# Patient Record
Sex: MALE | Race: BLACK OR AFRICAN AMERICAN | NOT HISPANIC OR LATINO | URBAN - METROPOLITAN AREA
[De-identification: names, ages, dates, MRNs, and addresses within clinical notes are randomized per-mention and may not be internally consistent; named-entity substitution may affect disease eponyms.]

---

## 2017-01-09 ENCOUNTER — OFFICE VISIT (OUTPATIENT)
Dept: RETAIL CLINIC | Facility: CLINIC | Age: 52
End: 2017-01-09

## 2017-01-09 VITALS
RESPIRATION RATE: 20 BRPM | HEART RATE: 87 BPM | OXYGEN SATURATION: 98 % | TEMPERATURE: 98.4 F | SYSTOLIC BLOOD PRESSURE: 130 MMHG | DIASTOLIC BLOOD PRESSURE: 90 MMHG

## 2017-01-09 DIAGNOSIS — Z84.89 FHX: ALLERGIES: ICD-10-CM

## 2017-01-09 DIAGNOSIS — J01.40 ACUTE PANSINUSITIS, RECURRENCE NOT SPECIFIED: Primary | ICD-10-CM

## 2017-01-09 DIAGNOSIS — J45.30 MILD PERSISTENT ASTHMA WITHOUT COMPLICATION: ICD-10-CM

## 2017-01-09 PROCEDURE — 99203 OFFICE O/P NEW LOW 30 MIN: CPT | Performed by: NURSE PRACTITIONER

## 2017-01-09 RX ORDER — ALBUTEROL SULFATE 90 UG/1
2 AEROSOL, METERED RESPIRATORY (INHALATION) EVERY 4 HOURS PRN
Qty: 1 INHALER | Refills: 0 | Status: SHIPPED | OUTPATIENT
Start: 2017-01-09 | End: 2017-02-08

## 2017-01-09 RX ORDER — BENZONATATE 200 MG/1
200 CAPSULE ORAL 3 TIMES DAILY PRN
Qty: 15 CAPSULE | Refills: 0 | Status: SHIPPED | OUTPATIENT
Start: 2017-01-09 | End: 2017-01-14

## 2017-01-09 RX ORDER — EPINEPHRINE 0.3 MG/.3ML
0.3 INJECTION SUBCUTANEOUS AS NEEDED
Qty: 1 EACH | Refills: 0 | Status: SHIPPED | OUTPATIENT
Start: 2017-01-09

## 2017-01-09 RX ORDER — FLUTICASONE PROPIONATE 50 MCG
2 SPRAY, SUSPENSION (ML) NASAL DAILY
Qty: 1 EACH | Refills: 0 | Status: SHIPPED | OUTPATIENT
Start: 2017-01-09 | End: 2017-02-08

## 2017-01-09 RX ORDER — DIPHENHYDRAMINE HCL 25 MG
25 CAPSULE ORAL EVERY 6 HOURS PRN
COMMUNITY

## 2017-01-09 RX ORDER — MONTELUKAST SODIUM 10 MG/1
10 TABLET ORAL NIGHTLY
Qty: 30 TABLET | Refills: 0 | Status: SHIPPED | OUTPATIENT
Start: 2017-01-09 | End: 2017-02-08

## 2017-01-09 RX ORDER — AMOXICILLIN AND CLAVULANATE POTASSIUM 875; 125 MG/1; MG/1
1 TABLET, FILM COATED ORAL 2 TIMES DAILY
Qty: 20 TABLET | Refills: 0 | Status: SHIPPED | OUTPATIENT
Start: 2017-01-09 | End: 2017-01-19

## 2017-01-09 NOTE — MR AVS SNAPSHOT
Ferdinand Bennett   1/9/2017 1:15 PM   Office Visit    Dept Phone:  [phone]   Encounter #:  [id number]    Provider:  MAYELA ALVA   Department:  [facility]                Your Full Care Plan              Today's Medication Changes          These changes are accurate as of: 1/9/17  1:46 PM.  If you have any questions, ask your nurse or doctor.               New Medication(s)Ordered:     albuterol 108 (90 BASE) MCG/ACT inhaler   Commonly known as:  PROVENTIL HFA;VENTOLIN HFA   Inhale 2 puffs Every 4 (Four) Hours As Needed for wheezing or shortness of air for up to 30 days.       amoxicillin-clavulanate 875-125 MG per tablet   Commonly known as:  AUGMENTIN   Take 1 tablet by mouth 2 (Two) Times a Day for 10 days.       benzonatate 200 MG capsule   Commonly known as:  TESSALON   Take 1 capsule by mouth 3 (Three) Times a Day As Needed for cough for up to 5 days.       EPINEPHrine 0.3 MG/0.3ML solution auto-injector injection   Commonly known as:  EPIPEN 2-KHADIJAH   Inject 0.3 mL under the skin As Needed (for severe reaction/resp distress).       fluticasone 50 MCG/ACT nasal spray   Commonly known as:  FLONASE   2 sprays into each nostril Daily for 30 days.       montelukast 10 MG tablet   Commonly known as:  SINGULAIR   Take 1 tablet by mouth Every Night for 30 days.            Where to Get Your Medications      These medications were sent to [facility] [address] [place] [address] [address] [place] [address] [facility] [phone] [facility] [phone]   [address] [facility] [address]     Phone:  [phone]     albuterol 108 (90 BASE) MCG/ACT inhaler    amoxicillin-clavulanate 875-125 MG per tablet    benzonatate 200 MG capsule    EPINEPHrine 0.3 MG/0.3ML solution auto-injector injection    fluticasone 50 MCG/ACT nasal spray    montelukast 10 MG tablet                  Your Updated Medication List          This list is accurate as of:  1/9/17  1:46 PM.  Always use your most recent med list.                albuterol 108 (90 BASE) MCG/ACT inhaler   Commonly known as:  PROVENTIL HFA;VENTOLIN HFA   Inhale 2 puffs Every 4 (Four) Hours As Needed for wheezing or shortness of air for up to 30 days.       amoxicillin-clavulanate 875-125 MG per tablet   Commonly known as:  AUGMENTIN   Take 1 tablet by mouth 2 (Two) Times a Day for 10 days.       benzonatate 200 MG capsule   Commonly known as:  TESSALON   Take 1 capsule by mouth 3 (Three) Times a Day As Needed for cough for up to 5 days.       EPINEPHrine 0.3 MG/0.3ML solution auto-injector injection   Commonly known as:  EPIPEN 2-KHADIJAH   Inject 0.3 mL under the skin As Needed (for severe reaction/resp distress).       fluticasone 50 MCG/ACT nasal spray   Commonly known as:  FLONASE   2 sprays into each nostril Daily for 30 days.       fluticasone-salmeterol 100-50 MCG/DOSE DISKUS   Commonly known as:  ADVAIR       montelukast 10 MG tablet   Commonly known as:  SINGULAIR   Take 1 tablet by mouth Every Night for 30 days.               You Were Diagnosed With        Codes Comments    Acute pansinusitis, recurrence not specified    -  Primary ICD-10-CM: J01.40  ICD-9-CM: 461.8     FHx: allergies     ICD-10-CM: Z84.89  ICD-9-CM: V19.6     Mild persistent asthma without complication     ICD-10-CM: J45.30  ICD-9-CM: 493.90       Instructions     None    Patient Instructions History      Upcoming Appointments     Visit Type Date Time Department    NEW PATIENT 1/9/2017  1:15 PM [facility] [place]      [employer] Signup     [facility] allows you to send messages to your doctor, view your test results, renew your prescriptions, schedule appointments, and more. To sign up, go to [employer] and click on the Sign Up Now link in the New User? box. Enter your [employer] Activation Code exactly as it appears below along with the last four digits of your Social Security Number and your Date of Birth  () to complete the sign-up process. If you do not sign up before the expiration date, you must request a new code.    [facility] Activation Code: [email or web address]  Expires: 2017  1:46 PM    If you have questions, you can email [email or web address] or call [phone] to talk to our [facility] staff. Remember, [facility] is NOT to be used for urgent needs. For medical emergencies, dial 911.               Other Info from Your Visit           Allergies     No Known Allergies      Reason for Visit     Cough           Vital Signs     Blood Pressure Pulse Temperature Respirations Oxygen Saturation Smoking Status    130/90 (BP Location: Right arm) 87 98.4 °F (36.9 °C) (Oral) 20 98% Current Every Day Smoker      Problems and Diagnoses Noted     Acute sinus infection    -  Primary    Family history of allergic disorder        Asthma

## 2017-01-09 NOTE — PROGRESS NOTES
Subjective   Ferdinand Bennett is a 51 y.o. male. Headache, chest/nasal congestion, itchy throat X 1 week.  Having sweating episodes. Feels like he is running a fever. Has taken multiple OTC medications with no improvement. Multiple ill contacts at home and his [employer] orth[employer]. Takes benadryl and Singulair  Daily. Has one dose left on Singulair; request refill today. Has a history of severe allergic reaction. 6th months ago, had reaction that require EMS to be called and hospitalization. At this time, he was referred to an [facility] to have skin testing. Has since moved from [facility] and needs to make an appointment with [facility]. He is concerned because he does not currently have any rescue/emergnecy medication to take in case off allergic reaction. See ROS.     History of Present Illness     The following portions of the patient's history were reviewed and updated as appropriate: allergies, current medications, past family history, past medical history, past social history, past surgical history and problem list.    Review of Systems   Constitutional: Positive for chills, diaphoresis and fever (subjective).   HENT: Positive for congestion, rhinorrhea, sinus pressure, sneezing and sore throat. Negative for postnasal drip.    Respiratory: Positive for cough (dry nonproductive). Negative for chest tightness, shortness of breath and wheezing.    Gastrointestinal: Negative.        Objective   Physical Exam   Constitutional: He appears well-developed.   HENT:   Head: Normocephalic.   Right Ear: Tympanic membrane and ear canal normal.   Left Ear: Tympanic membrane and ear canal normal.   Nose: Mucosal edema, rhinorrhea and sinus tenderness present. Right sinus exhibits no maxillary sinus tenderness and no frontal sinus tenderness. Left sinus exhibits no maxillary sinus tenderness and no frontal sinus tenderness.   Mouth/Throat: Uvula is midline and mucous membranes are normal. Posterior oropharyngeal erythema present. No  tonsillar exudate.   Cardiovascular: Normal rate and regular rhythm.    Pulmonary/Chest: Effort normal and breath sounds normal.   Lymphadenopathy:        Head (right side): No tonsillar adenopathy present.        Head (left side): No tonsillar adenopathy present.     He has no cervical adenopathy.   Skin: Skin is warm and dry.       Assessment/Plan   Ferdinand was seen today for cough.    Diagnoses and all orders for this visit:    Acute pansinusitis, recurrence not specified    FHx: allergies    Mild persistent asthma without complication    Other orders  -     amoxicillin-clavulanate (AUGMENTIN) 875-125 MG per tablet; Take 1 tablet by mouth 2 (Two) Times a Day for 10 days.  -     benzonatate (TESSALON) 200 MG capsule; Take 1 capsule by mouth 3 (Three) Times a Day As Needed for cough for up to 5 days.  -     fluticasone (FLONASE) 50 MCG/ACT nasal spray; 2 sprays into each nostril Daily for 30 days.  -     albuterol (PROVENTIL HFA;VENTOLIN HFA) 108 (90 BASE) MCG/ACT inhaler; Inhale 2 puffs Every 4 (Four) Hours As Needed for wheezing or shortness of air for up to 30 days.  -     EPINEPHrine (EPIPEN 2-KHADIJAH) 0.3 MG/0.3ML solution auto-injector injection; Inject 0.3 mL under the skin As Needed (for severe reaction/resp distress).  -     montelukast (SINGULAIR) 10 MG tablet; Take 1 tablet by mouth Every Night for 30 days.          Visit information reviewed with patient, including medication prescribed and patient instructions. All questions answered and given to patient/and or caregiver.     If symptoms worsen with fever >103, please seek further evaluation in the ER. Please F/U with PCP with concerns/and questions.     Marion Conde, APRN

## 2017-03-29 ENCOUNTER — OFFICE VISIT (OUTPATIENT)
Dept: RETAIL CLINIC | Facility: CLINIC | Age: 52
End: 2017-03-29

## 2017-03-29 VITALS
RESPIRATION RATE: 20 BRPM | OXYGEN SATURATION: 99 % | SYSTOLIC BLOOD PRESSURE: 134 MMHG | DIASTOLIC BLOOD PRESSURE: 80 MMHG | HEART RATE: 90 BPM

## 2017-03-29 DIAGNOSIS — J45.901 ASTHMA EXACERBATION: Primary | ICD-10-CM

## 2017-03-29 PROCEDURE — 99213 OFFICE O/P EST LOW 20 MIN: CPT | Performed by: NURSE PRACTITIONER

## 2017-03-29 PROCEDURE — 94640 AIRWAY INHALATION TREATMENT: CPT | Performed by: NURSE PRACTITIONER

## 2017-03-29 RX ORDER — ALBUTEROL SULFATE 2.5 MG/3ML
2.5 SOLUTION RESPIRATORY (INHALATION) EVERY 4 HOURS PRN
COMMUNITY
End: 2017-03-29

## 2017-03-29 RX ORDER — ALBUTEROL SULFATE 90 UG/1
2 AEROSOL, METERED RESPIRATORY (INHALATION) EVERY 4 HOURS PRN
Qty: 1 INHALER | Refills: 0 | Status: SHIPPED | OUTPATIENT
Start: 2017-03-29

## 2017-03-29 RX ORDER — METHYLPREDNISOLONE 4 MG/1
TABLET ORAL
Qty: 21 TABLET | Refills: 0 | Status: SHIPPED | OUTPATIENT
Start: 2017-03-29

## 2017-03-29 RX ORDER — GUAIFENESIN 600 MG/1
1200 TABLET, EXTENDED RELEASE ORAL 2 TIMES DAILY
Qty: 20 TABLET | Refills: 0 | Status: SHIPPED | OUTPATIENT
Start: 2017-03-29 | End: 2017-04-03

## 2017-03-29 NOTE — PROGRESS NOTES
Subjective   Ferdinand Bennett is a 51 y.o. male with complaints of worsening asthma with SOB and wheezing x 2 days. Pt ran out of his QVAR inhaler 1 week ago, has been using albuterol PRN with minimal relief. Pt also complains of rhinorrhea and itchy throat. See ROS     History of Present Illness     The following portions of the patient's history were reviewed and updated as appropriate: allergies, current medications, past family history, past medical history, past social history, past surgical history and problem list.    Review of Systems   Constitutional: Positive for activity change and fatigue. Negative for chills, diaphoresis and fever.   HENT: Positive for rhinorrhea.    Eyes: Negative.    Respiratory: Positive for chest tightness, shortness of breath and wheezing.    Cardiovascular: Negative.    Gastrointestinal: Negative.        Objective   Physical Exam   Constitutional: He appears well-developed.   HENT:   Head: Normocephalic.   Cardiovascular: Normal rate, regular rhythm and normal heart sounds.    Pulmonary/Chest: Effort normal. Tachypnea noted. He has decreased breath sounds in the right upper field, the right middle field, the right lower field, the left upper field, the left middle field and the left lower field. He has wheezes in the right upper field, the right middle field, the right lower field, the left upper field, the left middle field and the left lower field.   Post neb-less wheezing, however still inspiratory wheezes throughout. Pt reports improved breathing.    Neurological: He is alert.       Assessment/Plan   Diagnoses and all orders for this visit:    Asthma exacerbation    Other orders  -     beclomethasone (QVAR) 80 MCG/ACT inhaler; Inhale 2 puffs 2 (Two) Times a Day.  -     MethylPREDNISolone (MEDROL, KHADIJAH,) 4 MG tablet; Take as directed on package instructions.  -     guaiFENesin (MUCINEX) 600 MG 12 hr tablet; Take 2 tablets by mouth 2 (Two) Times a Day for 5 days.  -     albuterol  (PROVENTIL HFA;VENTOLIN HFA) 108 (90 BASE) MCG/ACT inhaler; Inhale 2 puffs Every 4 (Four) Hours As Needed for Wheezing or Shortness of Air.          Visit information reviewed with patient, including medication prescribed and patient instructions. All questions answered and given to patient/and or caregiver.     If symptoms worsen with fever >103, please seek further evaluation in the ER. Please F/U with PCP with concerns/and questions.     Marion Conde, APRN